# Patient Record
(demographics unavailable — no encounter records)

---

## 2025-02-07 NOTE — HISTORY OF PRESENT ILLNESS
[FreeTextEntry1] : 30yo here for vaginal itching and burning since November. States tried OTC monostat and ovules, which helped some but still having symptoms. Sometimes w dysuria.  States similar sx happened [place], was given a treatment and got better; unsure what dx was at the time or what the tx was.  Does seem associated w sexual activity, burning and itching worse after sex. [TextBox_4] : vaginal itching with odor on and off since November [PapSmeardate] : 12/6/23

## 2025-02-07 NOTE — PLAN
[FreeTextEntry1] : 28yo w vaginal itching and burning, and dysuria. No lesions or abnormalities visualized on PE, narrow introitus. Associated w sexual activity - possibly abrasions 2/2 sex - will send UCx and vaginitis swab - discussed foreplay and lubrication use for sex; pt will try to make these changes; if no improvement, can also consider pelvic floor therapy and vaginal dilators as possible further interventions  Will call in abnl results; otherwise RTC in [place] or for annual GYN

## 2025-02-07 NOTE — REASON FOR VISIT
[Follow-Up] : a follow-up evaluation of [Interpreters_IDNumber] : [id number] [TWNoteComboBox1] : [place]

## 2025-02-07 NOTE — PHYSICAL EXAM
[Chaperone Present] : A chaperone was present in the examining room during all aspects of the physical examination [[id number]] : A chaperone was present during the pelvic exam. [Appropriately responsive] : appropriately responsive [Alert] : alert [No Acute Distress] : no acute distress [Soft] : soft [Non-tender] : non-tender [Non-distended] : non-distended [No HSM] : No HSM [No Lesions] : no lesions [No Mass] : no mass [Oriented x3] : oriented x3

## 2025-02-07 NOTE — PHYSICAL EXAM
[Chaperone Present] : A chaperone was present in the examining room during all aspects of the physical examination [[id number]] : A chaperone was present during the pelvic exam. [Appropriately responsive] : appropriately responsive [Alert] : alert [No Acute Distress] : no acute distress [Soft] : soft [Non-tender] : non-tender [Non-distended] : non-distended [No HSM] : No HSM [No Lesions] : no lesions [No Mass] : no mass [Oriented x3] : oriented x3

## 2025-02-07 NOTE — REASON FOR VISIT
[Follow-Up] : a follow-up evaluation of [Interpreters_IDNumber] : [id number] [TWNoteComboBox1] : [place]

## 2025-02-07 NOTE — PLAN
[FreeTextEntry1] : 30yo w vaginal itching and burning, and dysuria. No lesions or abnormalities visualized on PE, narrow introitus. Associated w sexual activity - possibly abrasions 2/2 sex - will send UCx and vaginitis swab - discussed foreplay and lubrication use for sex; pt will try to make these changes; if no improvement, can also consider pelvic floor therapy and vaginal dilators as possible further interventions  Will call in abnl results; otherwise RTC in [place] or for annual GYN

## 2025-02-07 NOTE — HISTORY OF PRESENT ILLNESS
[FreeTextEntry1] : 28yo here for vaginal itching and burning since November. States tried OTC monostat and ovules, which helped some but still having symptoms. Sometimes w dysuria.  [place] happened [place], was given a treatment and got better; unsure what dx was at the time or what the tx was.  Does seem associated w sexual activity, burning and itching worse after sex. [TextBox_4] : vaginal itching with odor on and off since November [PapSmeardate] : 12/6/23